# Patient Record
Sex: MALE | Race: OTHER | Employment: FULL TIME | URBAN - NONMETROPOLITAN AREA
[De-identification: names, ages, dates, MRNs, and addresses within clinical notes are randomized per-mention and may not be internally consistent; named-entity substitution may affect disease eponyms.]

---

## 2018-07-31 ENCOUNTER — OFFICE VISIT (OUTPATIENT)
Dept: FAMILY MEDICINE CLINIC | Age: 28
End: 2018-07-31

## 2018-07-31 VITALS
DIASTOLIC BLOOD PRESSURE: 70 MMHG | OXYGEN SATURATION: 98 % | BODY MASS INDEX: 25.46 KG/M2 | WEIGHT: 198.41 LBS | HEIGHT: 74 IN | SYSTOLIC BLOOD PRESSURE: 110 MMHG | HEART RATE: 75 BPM | TEMPERATURE: 98.1 F | RESPIRATION RATE: 18 BRPM

## 2018-07-31 DIAGNOSIS — J06.9 VIRAL UPPER RESPIRATORY ILLNESS: Primary | ICD-10-CM

## 2018-07-31 DIAGNOSIS — J02.9 ACUTE VIRAL PHARYNGITIS: ICD-10-CM

## 2018-07-31 LAB — STREPTOCOCCUS A RNA: NEGATIVE

## 2018-07-31 PROCEDURE — 99202 OFFICE O/P NEW SF 15 MIN: CPT | Performed by: NURSE PRACTITIONER

## 2018-07-31 PROCEDURE — 87651 STREP A DNA AMP PROBE: CPT | Performed by: NURSE PRACTITIONER

## 2018-07-31 RX ORDER — BROMPHENIRAMINE MALEATE, PSEUDOEPHEDRINE HYDROCHLORIDE, AND DEXTROMETHORPHAN HYDROBROMIDE 2; 30; 10 MG/5ML; MG/5ML; MG/5ML
10 SYRUP ORAL 4 TIMES DAILY PRN
Qty: 200 ML | Refills: 0 | Status: SHIPPED | OUTPATIENT
Start: 2018-07-31

## 2018-07-31 RX ORDER — PREDNISONE 20 MG/1
20 TABLET ORAL 2 TIMES DAILY
Qty: 10 TABLET | Refills: 0 | Status: SHIPPED | OUTPATIENT
Start: 2018-07-31 | End: 2018-08-05

## 2018-07-31 ASSESSMENT — ENCOUNTER SYMPTOMS
NAUSEA: 0
SHORTNESS OF BREATH: 0
DIARRHEA: 0
COUGH: 1
EYE REDNESS: 0
WHEEZING: 0
COLOR CHANGE: 0
RHINORRHEA: 1
VOMITING: 0
SORE THROAT: 1
SINUS PRESSURE: 0
EYE DISCHARGE: 0
CHEST TIGHTNESS: 0
TROUBLE SWALLOWING: 0
PHOTOPHOBIA: 0
ABDOMINAL PAIN: 0

## 2018-07-31 NOTE — LETTER
[facility]  [address] [place] [address]  Phone: [phone]  Fax: [phone] [facility], APRN - CNP        July 31, 2018     Patient: Harmony Baltazar   YOB: 1990   Date of Visit: 7/31/2018       To Whom it May Concern:    Harmony Baltazar was seen in my clinic on 7/31/2018. He may return to work on 08/01/2018. If you have any questions or concerns, please don't hesitate to call.     Sincerely,         Criselda Dempsey, APRN - CNP

## 2018-07-31 NOTE — PATIENT INSTRUCTIONS
Patient Education        Dolor de garganta: Instrucciones de cuidado - [ Sore Throat: Care Instructions ]  Instrucciones de cuidado    Michelle infección por un virus o michelle bacteria causa la mayoría de los rohit de garganta. El humo del cigarrillo, el aire seco, el aire contaminado, las [place] y gritar también pueden causar dolor de garganta. El dolor de garganta puede ser intenso y [place]. Por jean, la mayoría de los rohit de garganta desaparecen por sí mismos. Si tiene [employer], el médico podría recetarle antibióticos. La atención de seguimiento es michelle parte clave de landeros tratamiento y seguridad. Asegúrese de hacer y acudir a todas las citas, y llame a landeros médico si está teniendo problemas. También es michelle buena idea saber los resultados de hyacinth exámenes y mantener michelle lista de los medicamentos que josselyn. ¿Cómo puede cuidarse en el hogar? · Si landeros médico le recetó antibióticos, tómelos según las indicaciones. No deje de tomarlos por el hecho de sentirse mejor. Debe sharif todos los antibióticos hasta terminarlos. · Tha gárgaras de agua salada tibia michelle vez cada hora para ayudar a reducir la hinchazón y aliviar la molestia. Mezcle 1 cucharadita de sal en 1 taza de agua tibia. · [place] un analgésico (medicamento para el dolor) de venta nirmal, lilli acetaminofén (Tylenol), ibuprofeno (Advil, Motrin) o naproxeno (Aleve). Denice y siga todas las instrucciones de la [place]. · Tenga cuidado cuando tome medicamentos de venta nirmal para el resfriado o la gripe y Tylenol al [employer]. Muchos de estos medicamentos contienen acetaminofén, o sea, Tylenol. Denice las etiquetas para asegurarse de que no está tomando michelle dosis mayor que la recomendada. El exceso de acetaminofén (Tylenol) puede ser dañino. · [place] abundantes líquidos. Los líquidos podrían ayudar a aliviar la irritación de la garganta. Beber líquidos calientes, lilli té o sopa, podría ayudarle a reducir el dolor de garganta.   · Chupe pastillas para causan [employer], lilli los resfriados y las paperas. Los síntomas de michelle infección viral respiratoria a menudo comienzan rápidamente. Incluyen fiebre, dolor de garganta y goteo nasal. También es posible que simplemente no se sienta eddie. O podría no tener [employer]. La mayoría de las infecciones virales respiratorias no son graves. Suelen mejorarse con tiempo y cuidado personal.  Los antibióticos no se usan para tratar michelle infección viral. [place] es porque los antibióticos no ayudan a curar michelle enfermedad viral. En algunos casos, los medicamentos antivirales pueden ayudar a landeros organismo a eliminar michelle infección viral grave. La atención de seguimiento es michelle parte clave de landeros tratamiento y seguridad. Asegúrese de hacer y acudir a todas las citas, y llame a landeros médico si está teniendo problemas. También es michelle buena idea saber los resultados de hyacinth exámenes y mantener michelle lista de los medicamentos que josselyn. ¿Cómo puede cuidarse en el hogar? · Descanse lo más que pueda hasta que se sienta mejor. · Sea katheryn con los medicamentos. [employer] medicamentos exactamente lilli le fueron recetados. Llame a landeros médico si merlin estar teniendo problemas con landeros medicamento. Recibirá más detalles sobre el medicamento específico recetado por landeros médico.  · [place] un analgésico (medicamento para el dolor) de venta nirmal, lilli acetaminofén (Tylenol), ibuprofeno (Advil, Motrin) o naproxeno (Aleve), cuando sea necesario para aliviar el dolor y la [place]. Denice y siga todas las instrucciones de la [place]. No le dé aspirina a ninguna persona shirley de 20 años. Mcqueen sido relacionada con el síndrome de [place], michelle enfermedad grave. · Ana abundantes líquidos, los suficientes lilli para que landeros orina sea de color amarillo dioni o transparente lilli el agua. Los líquidos calientes, lilli el té o la sopa, podrían ayudarle a aliviar la congestión de la nariz y la garganta.  Si tiene [place] [facility] [employer], del corazón o del hígado y tiene que [employer] líquidos, hable con landeros médico antes de aumentar landeros consumo. · Trate de sacar la mucosidad (flema) de los pulmones respirando profundamente y tosiendo. · Maver[place] gárgaras con agua salada tibia michelle vez por hora. [place] puede ayudar a disminuir la hinchazón y el dolor de garganta. Mezcle 1 cucharadita de sal en 1 taza de agua tibia. · No fume ni permita que otros lo bryan cerca de usted. Si necesita ayuda para dejar de fumar, hable con landeros médico sobre programas y medicamentos para dejar de fumar. Estos pueden aumentar hyacinth probabilidades de dejar el hábito para siempre. Para evitar propagar el virus  · Tosa o estornude en un pañuelo de papel y luego deséchelo. · Si no tiene un pañuelo de papel, cúbrase con la mano al toser o estornudar y [employer]. También puede toser [employer] manga de landeros ropa. · Lávese las sammi con frecuencia. Use agua tibia y [place]. Láveselas de 15 a 20 segundos cada vez. · Si no tiene [place] y jabón a landeros alcance, puede limpiarse las sammi con toallitas con alcohol o un gel a base de alcohol. ¿Cuándo debe pedir ayuda? Llame a landeros médico ahora mismo o busque atención médica inmediata si:    · Tiene fiebre nueva o que aumenta.     · La fiebre dura más de 48 horas.     · Tiene dificultades para respirar.     · Tiene fiebre junto con rigidez en el shana o dolor de richard intenso.     · Está sensible a la rad.     · Se siente muy somnoliento (con sueño) o confuso.    Preste especial atención a los cambios en landeros gely y asegúrese de comunicarse con landeros médico si:    · No mejora lilli se esperaba. ¿Dónde puede encontrar más información en inglés? Denis Pique a [email or web address] e ingrese a landeros cuenta de Brien. Kevinene Leonard [id number] en el cuadro \"Search Health Information\" para más información (en inglés) sobre \"Infección viral respiratoria: Instrucciones de cuidado - [ Viral Respiratory Infection: Care Instructions ]. \"     Si no tiene michelle cuenta, maverick shayna en

## 2018-07-31 NOTE — PROGRESS NOTES
submental, no submandibular and no tonsillar adenopathy present. Head (left side): No submental, no submandibular and no tonsillar adenopathy present. He has no cervical adenopathy. Neurological: He is alert and oriented to person, place, and time. Skin: Skin is warm, dry and intact. He is not diaphoretic. Psychiatric: He has a normal mood and affect. His speech is normal and behavior is normal.   Nursing note and vitals reviewed. DIAGNOSTIC RESULTS   Labs:  Results for orders placed or performed in visit on 07/31/18   POCT Rapid Strep A DNA (Alere i)   Result Value Ref Range    Streptococcus A RNA negative        IMAGING:  No orders to display     CLINICAL COURSE COURSE:     Vitals:    07/31/18 1026   BP: 110/70   Site: Left Arm   Position: Sitting   Pulse: 75   Resp: 18   Temp: 98.1 °F (36.7 °C)   TempSrc: Oral   SpO2: 98%   Weight: 198 lb 6.6 oz (90 kg)   Height: 6' 1.62\" (1.87 m)         FINAL IMPRESSION      1. Viral upper respiratory illness    2. Acute viral pharyngitis         DISPOSITION/PLAN     Alere strep testing is negative. Reported results to pt and he verbalized understanding. DIscussed care of viral URI illness. Prescribed Prednisone BID x 5 days for symptom management, as well as Bromfed PRN cough/congesiton. He may take plain Tylenol PRN pain, drink plenty of water and rest. He is given a work note for today, to return tomorrow as tolerated. He is to return if he develops fever, worse pain, or new problems. Patient in stable condition. Discharge instructions given by staff. PATIENT REFERRED TO:    Follow up with PCP or return if no better within 7-10  Days. If worse return sooner.      DISCHARGE MEDICATIONS:  New Prescriptions    BROMPHENIRAMINE-PSEUDOEPHEDRINE-DM 30-2-10 MG/5ML SYRUP    Take 10 mLs by mouth 4 times daily as needed for Congestion or Cough    PREDNISONE (DELTASONE) 20 MG TABLET    Take 1 tablet by mouth 2 times daily for 5 days         Electronically